# Patient Record
Sex: MALE | Race: WHITE | NOT HISPANIC OR LATINO | Employment: STUDENT | URBAN - NONMETROPOLITAN AREA
[De-identification: names, ages, dates, MRNs, and addresses within clinical notes are randomized per-mention and may not be internally consistent; named-entity substitution may affect disease eponyms.]

---
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
## 2022-02-23 ENCOUNTER — OFFICE VISIT (OUTPATIENT)
Dept: FAMILY MEDICINE | Facility: CLINIC | Age: 22
End: 2022-02-23
Payer: COMMERCIAL

## 2022-02-23 VITALS
TEMPERATURE: 98 F | HEIGHT: 69 IN | SYSTOLIC BLOOD PRESSURE: 122 MMHG | WEIGHT: 150 LBS | RESPIRATION RATE: 18 BRPM | DIASTOLIC BLOOD PRESSURE: 68 MMHG | BODY MASS INDEX: 22.22 KG/M2 | OXYGEN SATURATION: 99 % | HEART RATE: 101 BPM

## 2022-02-23 DIAGNOSIS — K52.9 GASTROENTERITIS: Primary | ICD-10-CM

## 2022-02-23 DIAGNOSIS — Z20.828 CONTACT WITH AND (SUSPECTED) EXPOSURE TO OTHER VIRAL COMMUNICABLE DISEASES: ICD-10-CM

## 2022-02-23 LAB
CTP QC/QA: YES
FLUAV AG NPH QL: NEGATIVE
FLUBV AG NPH QL: NEGATIVE
SARS-COV-2 AG RESP QL IA.RAPID: NEGATIVE

## 2022-02-23 PROCEDURE — 3074F SYST BP LT 130 MM HG: CPT | Mod: ,,, | Performed by: NURSE PRACTITIONER

## 2022-02-23 PROCEDURE — 1160F PR REVIEW ALL MEDS BY PRESCRIBER/CLIN PHARMACIST DOCUMENTED: ICD-10-PCS | Mod: ,,, | Performed by: NURSE PRACTITIONER

## 2022-02-23 PROCEDURE — 3078F PR MOST RECENT DIASTOLIC BLOOD PRESSURE < 80 MM HG: ICD-10-PCS | Mod: ,,, | Performed by: NURSE PRACTITIONER

## 2022-02-23 PROCEDURE — 3008F PR BODY MASS INDEX (BMI) DOCUMENTED: ICD-10-PCS | Mod: ,,, | Performed by: NURSE PRACTITIONER

## 2022-02-23 PROCEDURE — 1160F RVW MEDS BY RX/DR IN RCRD: CPT | Mod: ,,, | Performed by: NURSE PRACTITIONER

## 2022-02-23 PROCEDURE — 87428 POCT SARS-COV2 (COVID) WITH FLU ANTIGEN: ICD-10-PCS | Mod: QW,,, | Performed by: NURSE PRACTITIONER

## 2022-02-23 PROCEDURE — 3008F BODY MASS INDEX DOCD: CPT | Mod: ,,, | Performed by: NURSE PRACTITIONER

## 2022-02-23 PROCEDURE — 99212 OFFICE O/P EST SF 10 MIN: CPT | Mod: ,,, | Performed by: NURSE PRACTITIONER

## 2022-02-23 PROCEDURE — 3078F DIAST BP <80 MM HG: CPT | Mod: ,,, | Performed by: NURSE PRACTITIONER

## 2022-02-23 PROCEDURE — 99212 PR OFFICE/OUTPT VISIT, EST, LEVL II, 10-19 MIN: ICD-10-PCS | Mod: ,,, | Performed by: NURSE PRACTITIONER

## 2022-02-23 PROCEDURE — 1159F PR MEDICATION LIST DOCUMENTED IN MEDICAL RECORD: ICD-10-PCS | Mod: ,,, | Performed by: NURSE PRACTITIONER

## 2022-02-23 PROCEDURE — 87428 SARSCOV & INF VIR A&B AG IA: CPT | Mod: QW,,, | Performed by: NURSE PRACTITIONER

## 2022-02-23 PROCEDURE — 3074F PR MOST RECENT SYSTOLIC BLOOD PRESSURE < 130 MM HG: ICD-10-PCS | Mod: ,,, | Performed by: NURSE PRACTITIONER

## 2022-02-23 PROCEDURE — 1159F MED LIST DOCD IN RCRD: CPT | Mod: ,,, | Performed by: NURSE PRACTITIONER

## 2022-02-24 ENCOUNTER — OFFICE VISIT (OUTPATIENT)
Dept: FAMILY MEDICINE | Facility: CLINIC | Age: 22
End: 2022-02-24
Payer: COMMERCIAL

## 2022-02-24 VITALS
DIASTOLIC BLOOD PRESSURE: 72 MMHG | HEIGHT: 69 IN | HEART RATE: 68 BPM | SYSTOLIC BLOOD PRESSURE: 108 MMHG | TEMPERATURE: 98 F | BODY MASS INDEX: 22.22 KG/M2 | WEIGHT: 150 LBS | RESPIRATION RATE: 16 BRPM | OXYGEN SATURATION: 99 %

## 2022-02-24 DIAGNOSIS — K52.9 GASTROENTERITIS: ICD-10-CM

## 2022-02-24 DIAGNOSIS — R17 SCLERAL ICTERUS: Primary | ICD-10-CM

## 2022-02-24 DIAGNOSIS — R10.10 PAIN OF UPPER ABDOMEN: ICD-10-CM

## 2022-02-24 LAB
ALBUMIN SERPL BCP-MCNC: 4.5 G/DL (ref 3.5–5)
ALBUMIN/GLOB SERPL: 1.3 {RATIO}
ALP SERPL-CCNC: 114 U/L (ref 45–115)
ALT SERPL W P-5'-P-CCNC: 29 U/L (ref 16–61)
AMYLASE SERPL-CCNC: 41 U/L (ref 25–115)
ANION GAP SERPL CALCULATED.3IONS-SCNC: 9 MMOL/L (ref 7–16)
AST SERPL W P-5'-P-CCNC: 22 U/L (ref 15–37)
BASOPHILS # BLD AUTO: 0.01 K/UL (ref 0–0.2)
BASOPHILS NFR BLD AUTO: 0.3 % (ref 0–1)
BILIRUB SERPL-MCNC: 5.7 MG/DL (ref 0–1.2)
BUN SERPL-MCNC: 23 MG/DL (ref 7–18)
BUN/CREAT SERPL: 16 (ref 6–20)
CALCIUM SERPL-MCNC: 9.3 MG/DL (ref 8.5–10.1)
CHLORIDE SERPL-SCNC: 104 MMOL/L (ref 98–107)
CO2 SERPL-SCNC: 29 MMOL/L (ref 21–32)
CREAT SERPL-MCNC: 1.42 MG/DL (ref 0.7–1.3)
DIFFERENTIAL METHOD BLD: ABNORMAL
EOSINOPHIL # BLD AUTO: 0.03 K/UL (ref 0–0.5)
EOSINOPHIL NFR BLD AUTO: 0.8 % (ref 1–4)
ERYTHROCYTE [DISTWIDTH] IN BLOOD BY AUTOMATED COUNT: 13.5 % (ref 11.5–14.5)
GLOBULIN SER-MCNC: 3.4 G/DL (ref 2–4)
GLUCOSE SERPL-MCNC: 87 MG/DL (ref 74–106)
HCT VFR BLD AUTO: 46.9 % (ref 40–54)
HETEROPH AB SER QL LA: NEGATIVE
HGB BLD-MCNC: 15.9 G/DL (ref 13.5–18)
IMM GRANULOCYTES # BLD AUTO: 0.01 K/UL (ref 0–0.04)
IMM GRANULOCYTES NFR BLD: 0.3 % (ref 0–0.4)
LIPASE SERPL-CCNC: 120 U/L (ref 73–393)
LYMPHOCYTES # BLD AUTO: 1.2 K/UL (ref 1–4.8)
LYMPHOCYTES NFR BLD AUTO: 31.4 % (ref 27–41)
MCH RBC QN AUTO: 30.7 PG (ref 27–31)
MCHC RBC AUTO-ENTMCNC: 33.9 G/DL (ref 32–36)
MCV RBC AUTO: 90.5 FL (ref 80–96)
MONOCYTES # BLD AUTO: 0.65 K/UL (ref 0–0.8)
MONOCYTES NFR BLD AUTO: 17 % (ref 2–6)
MPC BLD CALC-MCNC: 11.6 FL (ref 9.4–12.4)
NEUTROPHILS # BLD AUTO: 1.92 K/UL (ref 1.8–7.7)
NEUTROPHILS NFR BLD AUTO: 50.2 % (ref 53–65)
NRBC # BLD AUTO: 0 X10E3/UL
NRBC, AUTO (.00): 0 %
PLATELET # BLD AUTO: 269 K/UL (ref 150–400)
POTASSIUM SERPL-SCNC: 3.6 MMOL/L (ref 3.5–5.1)
PROT SERPL-MCNC: 7.9 G/DL (ref 6.4–8.2)
RBC # BLD AUTO: 5.18 M/UL (ref 4.6–6.2)
SODIUM SERPL-SCNC: 138 MMOL/L (ref 136–145)
WBC # BLD AUTO: 3.82 K/UL (ref 4.5–11)

## 2022-02-24 PROCEDURE — 3078F DIAST BP <80 MM HG: CPT | Mod: ,,, | Performed by: FAMILY MEDICINE

## 2022-02-24 PROCEDURE — 86644 CMV ANTIBODY: CPT | Mod: 90,,, | Performed by: CLINICAL MEDICAL LABORATORY

## 2022-02-24 PROCEDURE — 80074 ACUTE HEPATITIS PANEL: CPT | Mod: ,,, | Performed by: CLINICAL MEDICAL LABORATORY

## 2022-02-24 PROCEDURE — 82150 ASSAY OF AMYLASE: CPT | Mod: ,,, | Performed by: CLINICAL MEDICAL LABORATORY

## 2022-02-24 PROCEDURE — 3008F BODY MASS INDEX DOCD: CPT | Mod: ,,, | Performed by: FAMILY MEDICINE

## 2022-02-24 PROCEDURE — 83690 LIPASE: ICD-10-PCS | Mod: ,,, | Performed by: CLINICAL MEDICAL LABORATORY

## 2022-02-24 PROCEDURE — 1159F MED LIST DOCD IN RCRD: CPT | Mod: ,,, | Performed by: FAMILY MEDICINE

## 2022-02-24 PROCEDURE — 3074F SYST BP LT 130 MM HG: CPT | Mod: ,,, | Performed by: FAMILY MEDICINE

## 2022-02-24 PROCEDURE — 3008F PR BODY MASS INDEX (BMI) DOCUMENTED: ICD-10-PCS | Mod: ,,, | Performed by: FAMILY MEDICINE

## 2022-02-24 PROCEDURE — 86645 CMV ANTIBODY IGM: CPT | Mod: 90,,, | Performed by: CLINICAL MEDICAL LABORATORY

## 2022-02-24 PROCEDURE — 86645 CYTOMEGALOVIRUS (CMV) AB, IGM: ICD-10-PCS | Mod: 90,,, | Performed by: CLINICAL MEDICAL LABORATORY

## 2022-02-24 PROCEDURE — 80074 HEPATITIS PANEL, ACUTE: ICD-10-PCS | Mod: ,,, | Performed by: CLINICAL MEDICAL LABORATORY

## 2022-02-24 PROCEDURE — 86644 CYTOMEGALOVIRUS ANTIBODY, IGG: ICD-10-PCS | Mod: 90,,, | Performed by: CLINICAL MEDICAL LABORATORY

## 2022-02-24 PROCEDURE — 86308 HETEROPHILE AB SCREEN: ICD-10-PCS | Mod: ,,, | Performed by: CLINICAL MEDICAL LABORATORY

## 2022-02-24 PROCEDURE — 85025 CBC WITH DIFFERENTIAL: ICD-10-PCS | Mod: ,,, | Performed by: CLINICAL MEDICAL LABORATORY

## 2022-02-24 PROCEDURE — 80053 COMPREHENSIVE METABOLIC PANEL: ICD-10-PCS | Mod: ,,, | Performed by: CLINICAL MEDICAL LABORATORY

## 2022-02-24 PROCEDURE — 1159F PR MEDICATION LIST DOCUMENTED IN MEDICAL RECORD: ICD-10-PCS | Mod: ,,, | Performed by: FAMILY MEDICINE

## 2022-02-24 PROCEDURE — 86308 HETEROPHILE ANTIBODY SCREEN: CPT | Mod: ,,, | Performed by: CLINICAL MEDICAL LABORATORY

## 2022-02-24 PROCEDURE — 83690 ASSAY OF LIPASE: CPT | Mod: ,,, | Performed by: CLINICAL MEDICAL LABORATORY

## 2022-02-24 PROCEDURE — 82150 AMYLASE: ICD-10-PCS | Mod: ,,, | Performed by: CLINICAL MEDICAL LABORATORY

## 2022-02-24 PROCEDURE — 3078F PR MOST RECENT DIASTOLIC BLOOD PRESSURE < 80 MM HG: ICD-10-PCS | Mod: ,,, | Performed by: FAMILY MEDICINE

## 2022-02-24 PROCEDURE — 80053 COMPREHEN METABOLIC PANEL: CPT | Mod: ,,, | Performed by: CLINICAL MEDICAL LABORATORY

## 2022-02-24 PROCEDURE — 85025 COMPLETE CBC W/AUTO DIFF WBC: CPT | Mod: ,,, | Performed by: CLINICAL MEDICAL LABORATORY

## 2022-02-24 PROCEDURE — 99214 OFFICE O/P EST MOD 30 MIN: CPT | Mod: ,,, | Performed by: FAMILY MEDICINE

## 2022-02-24 PROCEDURE — 3074F PR MOST RECENT SYSTOLIC BLOOD PRESSURE < 130 MM HG: ICD-10-PCS | Mod: ,,, | Performed by: FAMILY MEDICINE

## 2022-02-24 PROCEDURE — 99214 PR OFFICE/OUTPT VISIT, EST, LEVL IV, 30-39 MIN: ICD-10-PCS | Mod: ,,, | Performed by: FAMILY MEDICINE

## 2022-02-24 RX ORDER — ONDANSETRON 4 MG/1
4 TABLET, ORALLY DISINTEGRATING ORAL 2 TIMES DAILY
Qty: 30 TABLET | Refills: 0 | Status: SHIPPED | OUTPATIENT
Start: 2022-02-24 | End: 2023-01-19 | Stop reason: ALTCHOICE

## 2022-02-24 NOTE — PROGRESS NOTES
She   Nikolas Lomas DO   [address]  [place]  [address]      PATIENT NAME: Kathy Maza  : 2000  DATE: 22  MRN: [id number]      Billing Provider: Nikolas Lomas DO  Level of Service:   Patient PCP Information     Provider PCP Type    ELIDA Rios [facility]          Reason for Visit / Chief Complaint: Back Pain (Right mid back pain since last night. ) and Eye Color Change (Patient's  noticed that patient's eyes were yellow in color. Patient had viral gastroenteritis yesterday.)       Update PCP  Update Chief Complaint         History of Present Illness / Problem Focused Workflow     Kathy Maza presents to the clinic with Back Pain (Right mid back pain since last night. ) and Eye Color Change (Patient's  noticed that patient's eyes were yellow in color. Patient had viral gastroenteritis yesterday.)     Patient was seen yesterday with nausea vomiting and diarrhea.  Today he continues to have nausea but pain in his back on the right side.  He has noticed that his eyes have turned yellow and his urine started dark brown.  He has less diarrhea today.  He denies a cough congestion runny nose sore throat earache or changes in taste or smell.  Patient's COVID testing has been negative.  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      Review of Systems     Review of Systems   Constitutional: Negative for activity change, appetite change, chills, fatigue and fever.   HENT: Negative for nasal congestion, ear discharge, ear pain, mouth dryness, mouth sores, postnasal drip, sinus pressure/congestion, sore throat and voice change.    Eyes: Negative for pain, discharge, redness, itching and visual disturbance.   Respiratory: Negative for apnea, cough, chest tightness, shortness of breath and wheezing.    Cardiovascular: Negative for chest pain, palpitations and leg swelling.   Gastrointestinal: Positive for abdominal  pain, diarrhea, nausea and vomiting. Negative for abdominal distention, anal bleeding, blood in stool, change in bowel habit, constipation, reflux and change in bowel habit.   Endocrine: Negative for cold intolerance, heat intolerance, polydipsia, polyphagia and polyuria.   Genitourinary: Negative for difficulty urinating, enuresis, erectile dysfunction, frequency, genital sores, hematuria and urgency.   Musculoskeletal: Negative for arthralgias, back pain, gait problem, leg pain, myalgias and neck pain.   Integumentary:  Negative for rash, mole/lesion, breast mass and breast discharge.   Allergic/Immunologic: Negative for environmental allergies and food allergies.   Neurological: Negative for dizziness, vertigo, tremors, seizures, syncope, facial asymmetry, speech difficulty, weakness, light-headedness, numbness, headaches, disturbances in coordination, memory loss and coordination difficulties.   Hematological: Negative for adenopathy. Does not bruise/bleed easily.   Psychiatric/Behavioral: Negative for agitation, behavioral problems, confusion, decreased concentration, dysphoric mood, hallucinations, self-injury, sleep disturbance and suicidal ideas. The patient is not nervous/anxious and is not hyperactive.    Breast: Negative for mass      Medical / Social / Family History   History reviewed. No pertinent past medical history.    Past Surgical History:   Procedure Laterality Date    HAND SURGERY Left        Social History    reports that he has never smoked. He uses smokeless tobacco. He reports that he does not drink alcohol and does not use drugs.    Family History  's family history is not on file.    Medications and Allergies     Medications  No outpatient medications have been marked as taking for the 2/24/22 encounter (Office Visit) with Nikolas Lomas DO.       Allergies  Review of patient's allergies indicates:   Allergen Reactions    Penicillins      Other reaction(s): rash  Other  reaction(s): Dermatological problems, e.g., rash, hives         Physical Examination     Vitals:    02/24/22 1638   BP: 108/72   Pulse: 68   Resp: 16   Temp: 97.5 °F (36.4 °C)     Physical Exam  Constitutional:       General: He is not in acute distress.     Appearance: Normal appearance. He is normal weight.   HENT:      Head: Normocephalic.      Nose: Nose normal. No congestion or rhinorrhea.      Mouth/Throat:      Mouth: Mucous membranes are moist.      Pharynx: Oropharynx is clear. No oropharyngeal exudate or posterior oropharyngeal erythema.   Eyes:      General: Scleral icterus present.         Right eye: No discharge.         Left eye: No discharge.      Conjunctiva/sclera: Conjunctivae normal.      Pupils: Pupils are equal, round, and reactive to light.   Cardiovascular:      Rate and Rhythm: Normal rate and regular rhythm.      Pulses: Normal pulses.      Heart sounds: Normal heart sounds. No murmur heard.  Pulmonary:      Effort: Pulmonary effort is normal.      Breath sounds: Normal breath sounds. No wheezing.   Abdominal:      General: Abdomen is flat. Bowel sounds are normal.      Tenderness: There is abdominal tenderness.      Comments: Hepatomegaly noted at 18 cm in the midclavicular line with tender edge below the rib on the right.   Musculoskeletal:         General: Normal range of motion.      Cervical back: Normal range of motion.   Lymphadenopathy:      Cervical: No cervical adenopathy.   Skin:     General: Skin is warm.      Capillary Refill: Capillary refill takes less than 2 seconds.      Coloration: Skin is jaundiced.   Neurological:      General: No focal deficit present.      Mental Status: He is alert and oriented to person, place, and time. Mental status is at baseline.      Cranial Nerves: No cranial nerve deficit.      Sensory: No sensory deficit.      Motor: No weakness.      Coordination: Coordination normal.      Gait: Gait normal.      Deep Tendon Reflexes: Reflexes normal.    Psychiatric:         Mood and Affect: Mood normal.         Behavior: Behavior normal.         Thought Content: Thought content normal.         Judgment: Judgment normal.               No results found for: WBC, HGB, HCT, MCV, PLT       No results found for: NA, K, CL, CO2, GLU, BUN, CREATININE, CALCIUM, PROT, ALBUMIN, BILITOT, ALKPHOS, AST, ALT, ANIONGAP, ESTGFRAFRICA, EGFRNONAA   No image results found.     Procedures   Assessment and Plan (including Health Maintenance)      Problem List  Smart Sets  Document Outside HM   :    Plan:         Health Maintenance Due   Topic Date Due    Hepatitis C Screening  Never done    Lipid Panel  Never done    HPV Vaccines (1 - Male 2-dose series) Never done    HIV Screening  Never done       Problem List Items Addressed This Visit        GI    Scleral icterus - Primary    Current Assessment & Plan     She Likely has a hepatitis etiology but not certain at this stage.  Will give him Zofran as needed for nausea and vomiting.  Follow-up 24 hours after lab test her back.  Patient should not be playing sports for 2 weeks due to hepatomegaly.  Risk for hepatic rupture is high with attack sports.  Patient is a .           Relevant Orders    CBC Auto Differential    Comprehensive Metabolic Panel    Amylase    Lipase    Cytomegalovirus (CMV) Ab, IgM    Cytomegalovirus Antibody, IgG    Heterophile Ab Screen    Hepatitis Panel, Acute    Pain of upper abdomen    Relevant Orders    Cytomegalovirus (CMV) Ab, IgM    Cytomegalovirus Antibody, IgG    Heterophile Ab Screen    Hepatitis Panel, Acute    Gastroenteritis    Current Assessment & Plan     Patient is a gastroenteritis with scleral icterus likely hepatitis AT.  Will draw hepatitis panel as well as the CMV in the mono spot.  Will draw CBC CMP to check his liver function test.  And no contact sports for 2 weeks as he does have an enlarged liver.  Zofran as needed for nausea vomiting and Kaopectate or Imodium for  diarrhea.  Clear liquids for 24 hours then a brat diet.  No milk or milk products for 1 week.  Discussed the patient's case with his mother.  Follow-up 24 hours.           Relevant Medications    ondansetron (ZOFRAN-ODT) 4 MG TbDL    Other Relevant Orders    CBC Auto Differential    Comprehensive Metabolic Panel    Amylase    Lipase    Cytomegalovirus (CMV) Ab, IgM    Cytomegalovirus Antibody, IgG    Heterophile Ab Screen    Hepatitis Panel, Acute          Health Maintenance Topics with due status: Not Due       Topic Last Completion Date    TETANUS VACCINE 08/06/2015       No future appointments.     Follow up in about 1 day (around 2/25/2022).     Signature:  Nikolas Lomas DO  [place] [id number] [place]  [address]    Date of encounter: 2/24/22

## 2022-02-24 NOTE — ASSESSMENT & PLAN NOTE
She Likely has a hepatitis etiology but not certain at this stage.  Will give him Zofran as needed for nausea and vomiting.  Follow-up 24 hours after lab test her back.  Patient should not be playing sports for 2 weeks due to hepatomegaly.  Risk for hepatic rupture is high with attack sports.  Patient is a .

## 2022-02-24 NOTE — LETTER
February 24, 2022    Kathy Maza  [address] [place] [address] [place] [address]             [facility]  [address]  [place] [address]  Phone: [phone]  Fax: [phone] To Whom It May Concern    Patient:Kathy Maza  Date of Visit: 02/24/2022        Kathy Maza was seen at [facility] on 02/24/2022. 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 Patient may not participate in any sports or physical exercise for 2 weeks. Patient must be seen and released by doctor prior to returning to activities.          Dr. Nikolas Lomas,DO

## 2022-02-24 NOTE — ASSESSMENT & PLAN NOTE
Patient is a gastroenteritis with scleral icterus likely hepatitis AT.  Will draw hepatitis panel as well as the CMV in the mono spot.  Will draw CBC CMP to check his liver function test.  And no contact sports for 2 weeks as he does have an enlarged liver.  Zofran as needed for nausea vomiting and Kaopectate or Imodium for diarrhea.  Clear liquids for 24 hours then a brat diet.  No milk or milk products for 1 week.  Discussed the patient's case with his mother.  Follow-up 24 hours.

## 2022-02-25 LAB
HAV IGM SER QL: NORMAL
HBV CORE IGM SER QL: NORMAL
HBV SURFACE AG SERPL QL IA: NORMAL
HCV AB SER QL: NORMAL

## 2022-02-27 LAB
CMV IGG SERPL QL IA: NEGATIVE
CMV IGM SERPL QL IA: NEGATIVE

## 2022-02-28 NOTE — PROGRESS NOTES
ELIDA Fletcher   [facility]  [address]  [place] [address]     PATIENT NAME: Kathy Maza  : 2000  DATE: 22  MRN: [id number]      Billing Provider: ELIDA Fletcher  Level of Service:   Patient PCP Information     Provider PCP Type    ELIDA Rios General          Reason for Visit / Chief Complaint: Vomiting, Abdominal Pain, and Generalized Body Aches       Update PCP  Update Chief Complaint         History of Present Illness / Problem Focused Workflow     Kathy Maza presents to the clinic c/o muscle aches, vomiting and stomach ache since yesterday.    Review of Systems     Review of Systems   Constitutional: Negative.  Negative for activity change, chills, fatigue, fever and unexpected weight change.   HENT: Negative for nasal congestion, rhinorrhea, sinus pressure/congestion, sore throat and trouble swallowing.    Respiratory: Negative for cough and shortness of breath.    Cardiovascular: Negative for chest pain.   Gastrointestinal: Positive for abdominal pain (aching) and vomiting. Negative for abdominal distention, change in bowel habit, constipation, diarrhea, nausea and change in bowel habit.   Endocrine: Negative for polydipsia, polyphagia and polyuria.   Musculoskeletal: Positive for myalgias.   Neurological: Negative for weakness and headaches.        Medical / Social / Family History   History reviewed. No pertinent past medical history.    Past Surgical History:   Procedure Laterality Date    HAND SURGERY Left        Social History    reports that he has never smoked. He uses smokeless tobacco. He reports that he does not drink alcohol and does not use drugs.    Family History  's family history is not on file.    Medications and Allergies     Medications  No outpatient medications have been marked as taking for the 22 encounter (Office Visit) with ELIDA Canela.       Allergies  Review of patient's allergies indicates:  "  Allergen Reactions    Penicillins      Other reaction(s): rash  Other reaction(s): Dermatological problems, e.g., rash, hives         Physical Examination     Vitals:    02/23/22 1325   BP: 122/68   BP Location: Left arm   Patient Position: Sitting   BP Method: Large (Manual)   Pulse: 101   Resp: 18   Temp: 97.5 °F (36.4 °C)   SpO2: 99%   Weight: 68 kg (150 lb)   Height: 5' 9" (1.753 m)      Physical Exam  Constitutional:       General: He is not in acute distress.     Appearance: Normal appearance. He is not ill-appearing.   Neck:      Thyroid: No thyromegaly.   Cardiovascular:      Rate and Rhythm: Normal rate and regular rhythm.      Heart sounds: No murmur heard.  Pulmonary:      Effort: Pulmonary effort is normal. No accessory muscle usage or respiratory distress.      Breath sounds: Normal breath sounds. No wheezing, rhonchi or rales.   Abdominal:      General: Abdomen is flat. Bowel sounds are increased. There is no distension.      Palpations: Abdomen is soft.      Tenderness: There is no abdominal tenderness. There is no guarding or rebound.   Musculoskeletal:         General: Normal range of motion.      Cervical back: Neck supple.   Skin:     General: Skin is warm and dry.      Coloration: Skin is not jaundiced or pale.   Neurological:      Mental Status: He is alert and oriented to person, place, and time.      Cranial Nerves: Cranial nerves are intact.      Gait: Gait is intact.          Assessment and Plan (including Health Maintenance)      Problem List  Smart Sets  Document Outside HM   :        Health Maintenance Due   Topic Date Due    Lipid Panel  Never done    HPV Vaccines (1 - Male 2-dose series) Never done    HIV Screening  Never done       Problem List Items Addressed This Visit        GI    Gastroenteritis - Primary      Other Visit Diagnoses     Contact with and (suspected) exposure to other viral communicable diseases        Relevant Orders    POCT SARS-COV2 (COVID) with Flu Antigen " (Completed)        Gastroenteritis  Comments:  Clear liquid diet for 48 hours and can take zofran as needed. Increase diet as tolerated  COVID and influenza negative.    Contact with and (suspected) exposure to other viral communicable diseases  -     POCT SARS-COV2 (COVID) with Flu Antigen       Health Maintenance Topics with due status: Not Due       Topic Last Completion Date    TETANUS VACCINE 08/06/2015       Follow up if symptoms worsen or fail to improve in 24 hrs.     Signature:  ELIDA Fletcher    Date of encounter: 2/23/22

## 2022-09-30 ENCOUNTER — HOSPITAL ENCOUNTER (OUTPATIENT)
Dept: RADIOLOGY | Facility: HOSPITAL | Age: 22
Discharge: HOME | End: 2022-09-30
Attending: ORTHOPAEDIC SURGERY
Payer: COMMERCIAL

## 2022-09-30 DIAGNOSIS — M25.561 ACUTE PAIN OF RIGHT KNEE: ICD-10-CM

## 2022-09-30 PROCEDURE — 73562 X-RAY EXAM OF KNEE 3: CPT | Mod: TC,RT

## 2023-01-19 ENCOUNTER — OFFICE VISIT (OUTPATIENT)
Dept: FAMILY MEDICINE | Facility: CLINIC | Age: 23
End: 2023-01-19
Payer: COMMERCIAL

## 2023-01-19 VITALS
SYSTOLIC BLOOD PRESSURE: 110 MMHG | WEIGHT: 161 LBS | HEART RATE: 56 BPM | HEIGHT: 69 IN | BODY MASS INDEX: 23.85 KG/M2 | TEMPERATURE: 98 F | RESPIRATION RATE: 16 BRPM | DIASTOLIC BLOOD PRESSURE: 62 MMHG | OXYGEN SATURATION: 97 %

## 2023-01-19 DIAGNOSIS — R17 SCLERAL ICTERUS: Primary | ICD-10-CM

## 2023-01-19 LAB
ALBUMIN SERPL BCP-MCNC: 4.1 G/DL (ref 3.5–5)
ALBUMIN/GLOB SERPL: 1.6 {RATIO}
ALP SERPL-CCNC: 83 U/L (ref 45–115)
ALT SERPL W P-5'-P-CCNC: 33 U/L (ref 16–61)
ANION GAP SERPL CALCULATED.3IONS-SCNC: 10 MMOL/L (ref 7–16)
AST SERPL W P-5'-P-CCNC: 25 U/L (ref 15–37)
BASOPHILS # BLD AUTO: 0.04 K/UL (ref 0–0.2)
BASOPHILS NFR BLD AUTO: 0.7 % (ref 0–1)
BILIRUB SERPL-MCNC: 2.3 MG/DL (ref ?–1.2)
BUN SERPL-MCNC: 11 MG/DL (ref 7–18)
BUN/CREAT SERPL: 10 (ref 6–20)
CALCIUM SERPL-MCNC: 9.1 MG/DL (ref 8.5–10.1)
CHLORIDE SERPL-SCNC: 107 MMOL/L (ref 98–107)
CO2 SERPL-SCNC: 30 MMOL/L (ref 21–32)
CREAT SERPL-MCNC: 1.09 MG/DL (ref 0.7–1.3)
DIFFERENTIAL METHOD BLD: ABNORMAL
EGFR (NO RACE VARIABLE) (RUSH/TITUS): 100 ML/MIN/1.73M²
EOSINOPHIL # BLD AUTO: 0.09 K/UL (ref 0–0.5)
EOSINOPHIL NFR BLD AUTO: 1.5 % (ref 1–4)
ERYTHROCYTE [DISTWIDTH] IN BLOOD BY AUTOMATED COUNT: 13.4 % (ref 11.5–14.5)
GLOBULIN SER-MCNC: 2.6 G/DL (ref 2–4)
GLUCOSE SERPL-MCNC: 97 MG/DL (ref 74–106)
HAPTOGLOB SERPL NEPH-MCNC: 70 MG/DL (ref 30–200)
HCT VFR BLD AUTO: 43.3 % (ref 40–54)
HGB BLD-MCNC: 14.4 G/DL (ref 13.5–18)
IMM GRANULOCYTES # BLD AUTO: 0.01 K/UL (ref 0–0.04)
IMM GRANULOCYTES NFR BLD: 0.2 % (ref 0–0.4)
LYMPHOCYTES # BLD AUTO: 1.54 K/UL (ref 1–4.8)
LYMPHOCYTES NFR BLD AUTO: 25.8 % (ref 27–41)
MCH RBC QN AUTO: 30.4 PG (ref 27–31)
MCHC RBC AUTO-ENTMCNC: 33.3 G/DL (ref 32–36)
MCV RBC AUTO: 91.4 FL (ref 80–96)
MONOCYTES # BLD AUTO: 0.57 K/UL (ref 0–0.8)
MONOCYTES NFR BLD AUTO: 9.5 % (ref 2–6)
MPC BLD CALC-MCNC: 11.1 FL (ref 9.4–12.4)
NEUTROPHILS # BLD AUTO: 3.72 K/UL (ref 1.8–7.7)
NEUTROPHILS NFR BLD AUTO: 62.3 % (ref 53–65)
NRBC # BLD AUTO: 0 X10E3/UL
NRBC, AUTO (.00): 0 %
PLATELET # BLD AUTO: 308 K/UL (ref 150–400)
POTASSIUM SERPL-SCNC: 4 MMOL/L (ref 3.5–5.1)
PROT SERPL-MCNC: 6.7 G/DL (ref 6.4–8.2)
RBC # BLD AUTO: 4.74 M/UL (ref 4.6–6.2)
SODIUM SERPL-SCNC: 143 MMOL/L (ref 136–145)
WBC # BLD AUTO: 5.97 K/UL (ref 4.5–11)

## 2023-01-19 PROCEDURE — 1159F PR MEDICATION LIST DOCUMENTED IN MEDICAL RECORD: ICD-10-PCS | Mod: ,,, | Performed by: FAMILY MEDICINE

## 2023-01-19 PROCEDURE — 3008F BODY MASS INDEX DOCD: CPT | Mod: ,,, | Performed by: FAMILY MEDICINE

## 2023-01-19 PROCEDURE — 1160F RVW MEDS BY RX/DR IN RCRD: CPT | Mod: ,,, | Performed by: FAMILY MEDICINE

## 2023-01-19 PROCEDURE — 3074F SYST BP LT 130 MM HG: CPT | Mod: ,,, | Performed by: FAMILY MEDICINE

## 2023-01-19 PROCEDURE — 82955 GLUCOSE 6 PHOSPHATE DEHYDROGENASE: ICD-10-PCS | Mod: 90,,, | Performed by: CLINICAL MEDICAL LABORATORY

## 2023-01-19 PROCEDURE — 99213 OFFICE O/P EST LOW 20 MIN: CPT | Mod: ,,, | Performed by: FAMILY MEDICINE

## 2023-01-19 PROCEDURE — 83020 HEMOGLOBIN ELECTROPHORESIS: ICD-10-PCS | Mod: ,,, | Performed by: CLINICAL MEDICAL LABORATORY

## 2023-01-19 PROCEDURE — 99213 PR OFFICE/OUTPT VISIT, EST, LEVL III, 20-29 MIN: ICD-10-PCS | Mod: ,,, | Performed by: FAMILY MEDICINE

## 2023-01-19 PROCEDURE — 85025 CBC WITH DIFFERENTIAL: ICD-10-PCS | Mod: ,,, | Performed by: CLINICAL MEDICAL LABORATORY

## 2023-01-19 PROCEDURE — 83020 HEMOGLOBIN ELECTROPHORESIS: CPT | Mod: ,,, | Performed by: CLINICAL MEDICAL LABORATORY

## 2023-01-19 PROCEDURE — 3078F DIAST BP <80 MM HG: CPT | Mod: ,,, | Performed by: FAMILY MEDICINE

## 2023-01-19 PROCEDURE — 3008F PR BODY MASS INDEX (BMI) DOCUMENTED: ICD-10-PCS | Mod: ,,, | Performed by: FAMILY MEDICINE

## 2023-01-19 PROCEDURE — 80053 COMPREHEN METABOLIC PANEL: CPT | Mod: ,,, | Performed by: CLINICAL MEDICAL LABORATORY

## 2023-01-19 PROCEDURE — 83020 HEMOGLOBIN ELECTROPHORESIS: ICD-10-PCS | Mod: 26,,, | Performed by: PATHOLOGY

## 2023-01-19 PROCEDURE — 3074F PR MOST RECENT SYSTOLIC BLOOD PRESSURE < 130 MM HG: ICD-10-PCS | Mod: ,,, | Performed by: FAMILY MEDICINE

## 2023-01-19 PROCEDURE — 83010 ASSAY OF HAPTOGLOBIN QUANT: CPT | Mod: ,,, | Performed by: CLINICAL MEDICAL LABORATORY

## 2023-01-19 PROCEDURE — 80504 PATH CLIN CONSLTJ MOD 21-40: CPT | Mod: ,,, | Performed by: PATHOLOGY

## 2023-01-19 PROCEDURE — 80053 COMPREHENSIVE METABOLIC PANEL: ICD-10-PCS | Mod: ,,, | Performed by: CLINICAL MEDICAL LABORATORY

## 2023-01-19 PROCEDURE — 1160F PR REVIEW ALL MEDS BY PRESCRIBER/CLIN PHARMACIST DOCUMENTED: ICD-10-PCS | Mod: ,,, | Performed by: FAMILY MEDICINE

## 2023-01-19 PROCEDURE — 1159F MED LIST DOCD IN RCRD: CPT | Mod: ,,, | Performed by: FAMILY MEDICINE

## 2023-01-19 PROCEDURE — 83010 HAPTOGLOBIN: ICD-10-PCS | Mod: ,,, | Performed by: CLINICAL MEDICAL LABORATORY

## 2023-01-19 PROCEDURE — 82955 ASSAY OF G6PD ENZYME: CPT | Mod: 90,,, | Performed by: CLINICAL MEDICAL LABORATORY

## 2023-01-19 PROCEDURE — 85025 COMPLETE CBC W/AUTO DIFF WBC: CPT | Mod: ,,, | Performed by: CLINICAL MEDICAL LABORATORY

## 2023-01-19 PROCEDURE — 83020 HEMOGLOBIN ELECTROPHORESIS: CPT | Mod: 26,,, | Performed by: PATHOLOGY

## 2023-01-19 PROCEDURE — 80504 PATH REVIEW OF BLOOD SMEAR: ICD-10-PCS | Mod: ,,, | Performed by: PATHOLOGY

## 2023-01-19 PROCEDURE — 3078F PR MOST RECENT DIASTOLIC BLOOD PRESSURE < 80 MM HG: ICD-10-PCS | Mod: ,,, | Performed by: FAMILY MEDICINE

## 2023-01-20 LAB — PATH REV BLD -IMP: NORMAL

## 2023-01-23 LAB — G6PD RBC-CCNT: 8.8 U/G HB (ref 8–11.9)

## 2023-01-23 NOTE — PROGRESS NOTES
Subjective:       Patient ID: Kathy Maza is a 22 y.o. male.    Chief Complaint: Eye Color Change (Patient's team mates noticed that patient icterus were discolored today. Patient has history of this same issue last year.)    22 y.o. male with a PMHx of scleral icterus and gastroenteritis presented to the clinic reporting yellow eyes today. Pt reports he was at his game and his teammates started pointing out his eyes were yellow. Pt denies any trauma, sick contact, recent travel, ibuprofen, testosterone, or any other new food or hygiene products. Per patient, he does take a pre-workout called bucked up. Pt denies any fever, N/V/D, abdominal pain, black or tarry stools, dysuria, or any other complaints at this time.       No current outpatient medications on file.    Review of patient's allergies indicates:   Allergen Reactions    Penicillins      Other reaction(s): rash  Other reaction(s): Dermatological problems, e.g., rash, hives         History reviewed. No pertinent past medical history.    Past Surgical History:   Procedure Laterality Date    HAND SURGERY Left        Family History   Problem Relation Age of Onset    No Known Problems Mother     Suicide Father     No Known Problems Brother        Social History     Tobacco Use    Smoking status: Never   Substance Use Topics    Alcohol use: Yes    Drug use: Never       Review of Systems   Constitutional:  Negative for chills and fever.   HENT:  Negative for hearing loss and sore throat.    Eyes:  Negative for photophobia, pain, discharge, redness, itching, visual disturbance and eye dryness.        Yellow eyes bilaterally   Respiratory:  Negative for cough, chest tightness, shortness of breath and wheezing.    Cardiovascular:  Negative for chest pain, palpitations and leg swelling.   Gastrointestinal:  Negative for abdominal pain, diarrhea, nausea and vomiting.   Endocrine: Negative for polyuria.   Genitourinary:  Negative for dysuria and hematuria.  "  Musculoskeletal:  Negative for gait problem and myalgias.   Integumentary:  Positive for color change (eyes). Negative for rash.   Neurological:  Negative for speech difficulty, weakness, light-headedness and headaches.   Psychiatric/Behavioral:  The patient is nervous/anxious.        Current Medications:   Medication List with Changes/Refills   Discontinued Medications    ONDANSETRON (ZOFRAN-ODT) 4 MG TBDL    Take 2 tablets (8 mg total) by mouth every 6 (six) hours as needed (nausea and vomiting).       Start Date: 11/11/2021End Date: 1/19/2023    ONDANSETRON (ZOFRAN-ODT) 4 MG TBDL    Take 1 tablet (4 mg total) by mouth 2 (two) times daily.       Start Date: 2/24/2022 End Date: 1/19/2023            Objective:        Vitals:    01/19/23 1417   BP: 110/62   BP Location: Left arm   Patient Position: Sitting   BP Method: Medium (Manual)   Pulse: (!) 56   Resp: 16   Temp: 97.8 °F (36.6 °C)   TempSrc: Oral   SpO2: 97%   Weight: 73 kg (161 lb)   Height: 5' 9" (1.753 m)       Physical Exam  Vitals reviewed.   Constitutional:       General: He is not in acute distress.     Appearance: Normal appearance. He is normal weight. He is not ill-appearing, toxic-appearing or diaphoretic.   HENT:      Head: Normocephalic and atraumatic.      Right Ear: External ear normal.      Left Ear: External ear normal.      Nose: Nose normal.      Mouth/Throat:      Mouth: Mucous membranes are moist.      Pharynx: Oropharynx is clear.   Eyes:      General: Scleral icterus present.      Extraocular Movements: Extraocular movements intact.      Conjunctiva/sclera: Conjunctivae normal.      Pupils: Pupils are equal, round, and reactive to light.   Cardiovascular:      Rate and Rhythm: Normal rate and regular rhythm.      Pulses: Normal pulses.      Heart sounds: Normal heart sounds. No murmur heard.  Pulmonary:      Effort: Pulmonary effort is normal. No respiratory distress.      Breath sounds: Normal breath sounds. No wheezing, rhonchi or " rales.   Abdominal:      General: Abdomen is flat. Bowel sounds are normal. There is no distension.      Palpations: Abdomen is soft.      Tenderness: There is no abdominal tenderness. There is no guarding or rebound.   Musculoskeletal:         General: No swelling. Normal range of motion.      Cervical back: Normal range of motion and neck supple. No rigidity.   Skin:     General: Skin is warm and dry.      Capillary Refill: Capillary refill takes less than 2 seconds.      Findings: No rash.   Neurological:      General: No focal deficit present.      Mental Status: He is alert and oriented to person, place, and time. Mental status is at baseline.   Psychiatric:         Mood and Affect: Mood normal.         Behavior: Behavior normal.         Thought Content: Thought content normal.         Judgment: Judgment normal.             Lab Results   Component Value Date    WBC 5.97 01/19/2023    HGB 14.4 01/19/2023    HCT 43.3 01/19/2023     01/19/2023    ALT 33 01/19/2023    AST 25 01/19/2023     01/19/2023    K 4.0 01/19/2023     01/19/2023    CREATININE 1.09 01/19/2023    BUN 11 01/19/2023    CO2 30 01/19/2023      Assessment:       1. Scleral icterus          Plan:         Problem List Items Addressed This Visit          GI    Scleral icterus - Primary     -CMP shows elevated total bilirubin.Peripheral blood smear pathologist's review shows normal morphology. No evidence of malignancy, dysplasia, hemolysis, or pathologic organisms.   -G6PD and hemoglobin electropharesis are still pending  -pt told to stop taking his pre-workout after looking at the ingredients in his pre-workout during office visit.         Relevant Orders    CBC Auto Differential (Completed)    Comprehensive Metabolic Panel (Completed)    Haptoglobin (Completed)    Glucose-6-Phosphate Dehydrogenase    Hemglobin Electrophoresis    Path Review, Peripheral Smear (Completed)         Follow up in about 1 month (around 2/19/2023), or if  symptoms worsen or fail to improve.    Evelina JOSE Sousa DO     Instructed patient that if symptoms fail to improve or worsen patient should seek immediate medical attention or report to the nearest emergency department. Patient expressed verbal agreement and understanding to this plan of care.

## 2023-01-23 NOTE — ASSESSMENT & PLAN NOTE
-CMP shows elevated total bilirubin.Peripheral blood smear pathologist's review shows normal morphology. No evidence of malignancy, dysplasia, hemolysis, or pathologic organisms.   -G6PD and hemoglobin electropharesis are still pending  -pt told to stop taking his pre-workout after looking at the ingredients in his pre-workout during office visit.

## 2023-01-24 LAB
ERYTHROCYTE [DISTWIDTH] IN BLOOD BY AUTOMATED COUNT: 13.5 % (ref 11.5–14.5)
HCT VFR BLD AUTO: 44 % (ref 40–54)
HGB A1 MFR BLD ELPH: 97.5 % (ref 95.8–98)
HGB A2 MFR BLD ELPH: 2.5 % (ref 2–3.3)
HGB BLD-MCNC: 14 G/DL (ref 13.5–18)
MCV RBC AUTO: 93.8 FL (ref 80–96)
PATH INTERP BLD-IMP: NORMAL
RBC # BLD AUTO: 4.69 M/UL (ref 4.6–6.2)

## 2023-01-26 ENCOUNTER — TELEPHONE (OUTPATIENT)
Dept: FAMILY MEDICINE | Facility: CLINIC | Age: 23
End: 2023-01-26
Payer: COMMERCIAL

## 2023-01-26 DIAGNOSIS — R17 HIGH SERUM TOTAL BILIRUBIN: Primary | ICD-10-CM

## 2023-01-27 NOTE — TELEPHONE ENCOUNTER
Received VO from Dr. Lomas for a bilirubin direct and indirect lab to be performed. Discussed results and additional labs and GI referral with Pt's mother, she stated that he would need to be referred to a GI specialist in the [facility], or [place] area since he will move back to that area after finishing college. She will notify the Pt to come back to the clinic for additional labs.

## 2023-05-05 ENCOUNTER — PATIENT OUTREACH (OUTPATIENT)
Dept: ADMINISTRATIVE | Facility: HOSPITAL | Age: 23
End: 2023-05-05

## 2023-05-05 NOTE — PROGRESS NOTES
Per [employer] website, insurance is active and pt is listed on the attributed list needing a healthy you performed in 2023  Pt needs appt for hy, sent to [facility] to schedule via one note  Added myself to caregisselle            .  Health Maintenance Due   Topic Date Due    Lipid Panel  Never done    COVID-19 Vaccine (1) Never done    HPV Vaccines (1 - Male 2-dose series) Never done    HIV Screening  Never done

## 2023-10-03 ENCOUNTER — HOSPITAL ENCOUNTER (OUTPATIENT)
Dept: RADIOLOGY | Facility: HOSPITAL | Age: 23
Discharge: HOME | End: 2023-10-03
Attending: ORTHOPAEDIC SURGERY
Payer: COMMERCIAL

## 2023-10-03 ENCOUNTER — OFFICE VISIT (OUTPATIENT)
Dept: ORTHOPEDICS | Facility: CLINIC | Age: 23
End: 2023-10-03
Payer: COMMERCIAL

## 2023-10-03 DIAGNOSIS — M79.643 PAIN OF HAND, UNSPECIFIED LATERALITY: Primary | ICD-10-CM

## 2023-10-03 DIAGNOSIS — M25.531 WRIST PAIN, ACUTE, RIGHT: ICD-10-CM

## 2023-10-03 DIAGNOSIS — M25.561 ACUTE PAIN OF RIGHT KNEE: Primary | ICD-10-CM

## 2023-10-03 PROCEDURE — 73110 X-RAY EXAM OF WRIST: CPT | Mod: TC,RT

## 2023-10-03 PROCEDURE — 99213 OFFICE O/P EST LOW 20 MIN: CPT | Mod: S$GLB,,, | Performed by: ORTHOPAEDIC SURGERY

## 2023-10-03 PROCEDURE — 99212 OFFICE O/P EST SF 10 MIN: CPT | Mod: PBBFAC | Performed by: ORTHOPAEDIC SURGERY

## 2023-10-03 PROCEDURE — 73110 XR WRIST COMPLETE 3 VIEWS RIGHT: ICD-10-PCS | Mod: 26,RT,, | Performed by: ORTHOPAEDIC SURGERY

## 2023-10-03 PROCEDURE — 73110 X-RAY EXAM OF WRIST: CPT | Mod: 26,RT,, | Performed by: ORTHOPAEDIC SURGERY

## 2023-10-03 PROCEDURE — 99213 PR OFFICE/OUTPT VISIT, EST, LEVL III, 20-29 MIN: ICD-10-PCS | Mod: S$GLB,,, | Performed by: ORTHOPAEDIC SURGERY

## 2023-10-03 NOTE — PROGRESS NOTES
CC:   Chief Complaint   Patient presents with    Right Wrist - Injury     KOBE        PREVIOUS INFO:        HISTORY:   10/3/2023    Kathy Maza  is a 22 y.o. 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       PAST SURGICAL HISTORY:   Past Surgical History:   Procedure Laterality Date    HAND SURGERY Left           ALLERGIES:   Review of patient's allergies indicates:   Allergen Reactions    Penicillins      Other reaction(s): rash  Other reaction(s): Dermatological problems, e.g., rash, hives          MEDICATIONS :  No current outpatient medications on file.     SOCIAL HISTORY:   Social History     Socioeconomic History    Marital status: Single    Number of children: 0   Tobacco Use    Smoking status: Never   Substance and Sexual Activity    Alcohol use: Yes    Drug use: Never    Sexual activity: Yes     Partners: Female     Birth control/protection: Condom   Social History Narrative    Lives in the dorms at [facility]        ROS    FAMILY HISTORY:   Family History   Problem Relation Age of Onset    No Known Problems Mother     Suicide Father     No Known Problems Brother           PHYSICAL EXAM: There were no vitals filed for this visit.            There is no height or weight on file to calculate BMI.     In general, this is a well-developed, well-nourished male . The patient is alert, oriented and cooperative.      HEENT:  Normocephalic, atraumatic.  Extraocular movements are intact bilaterally.  The oropharynx is benign.       NECK:  Nontender with good range of motion.      PULMONARY: Respirations are even and non-labored.       CARDIOVASCULAR: Pulses regular by peripheral palpation.       ABDOMEN:  Soft,  non-tender, non-distended.        EXTREMITIES:  On exam right upper extremity elbows nontender the wrist itself is nontender the fingers are nontender he has full range of motion he is tender over the distal forearm on the  radial dorsal side no crepitance consistent with the intersection syndrome    Ortho Exam      RADIOGRAPHIC FINDINGS:  Right wrist three views AP lateral oblique view normal bone mineralization growth plates are closed normal carpal alignment no fracture dislocation appreciated      .      IMPRESSION:  Right forearm swelling from a direct blow baseball hit him yesterday will treat conservatively with negative x-rays wrist brace Voltaren gel ice let it calmed down he can do gentle range of motion see him back p.r.n. will treat conservatively ice Voltaren gel wrist brace want him to a  Restless thing done developing intersection syndrome      PLAN:  P.r.n.        No follow-ups on file.         Bruce Carrillo III      (Subject to voice recognition error, transcription service not allowed)

## 2023-11-07 ENCOUNTER — OFFICE VISIT (OUTPATIENT)
Dept: FAMILY MEDICINE | Facility: CLINIC | Age: 23
End: 2023-11-07
Payer: COMMERCIAL

## 2023-11-07 VITALS
HEIGHT: 69 IN | RESPIRATION RATE: 16 BRPM | BODY MASS INDEX: 23.35 KG/M2 | DIASTOLIC BLOOD PRESSURE: 80 MMHG | TEMPERATURE: 98 F | WEIGHT: 157.63 LBS | HEART RATE: 77 BPM | SYSTOLIC BLOOD PRESSURE: 118 MMHG | OXYGEN SATURATION: 99 %

## 2023-11-07 DIAGNOSIS — R17 SCLERAL ICTERUS: Primary | ICD-10-CM

## 2023-11-07 PROCEDURE — 1160F PR REVIEW ALL MEDS BY PRESCRIBER/CLIN PHARMACIST DOCUMENTED: ICD-10-PCS | Mod: ,,, | Performed by: NURSE PRACTITIONER

## 2023-11-07 PROCEDURE — 1159F PR MEDICATION LIST DOCUMENTED IN MEDICAL RECORD: ICD-10-PCS | Mod: ,,, | Performed by: NURSE PRACTITIONER

## 2023-11-07 PROCEDURE — 3074F PR MOST RECENT SYSTOLIC BLOOD PRESSURE < 130 MM HG: ICD-10-PCS | Mod: ,,, | Performed by: NURSE PRACTITIONER

## 2023-11-07 PROCEDURE — 3079F DIAST BP 80-89 MM HG: CPT | Mod: ,,, | Performed by: NURSE PRACTITIONER

## 2023-11-07 PROCEDURE — 99212 OFFICE O/P EST SF 10 MIN: CPT | Mod: ,,, | Performed by: NURSE PRACTITIONER

## 2023-11-07 PROCEDURE — 3008F PR BODY MASS INDEX (BMI) DOCUMENTED: ICD-10-PCS | Mod: ,,, | Performed by: NURSE PRACTITIONER

## 2023-11-07 PROCEDURE — 1160F RVW MEDS BY RX/DR IN RCRD: CPT | Mod: ,,, | Performed by: NURSE PRACTITIONER

## 2023-11-07 PROCEDURE — 3074F SYST BP LT 130 MM HG: CPT | Mod: ,,, | Performed by: NURSE PRACTITIONER

## 2023-11-07 PROCEDURE — 99212 PR OFFICE/OUTPT VISIT, EST, LEVL II, 10-19 MIN: ICD-10-PCS | Mod: ,,, | Performed by: NURSE PRACTITIONER

## 2023-11-07 PROCEDURE — 3079F PR MOST RECENT DIASTOLIC BLOOD PRESSURE 80-89 MM HG: ICD-10-PCS | Mod: ,,, | Performed by: NURSE PRACTITIONER

## 2023-11-07 PROCEDURE — 1159F MED LIST DOCD IN RCRD: CPT | Mod: ,,, | Performed by: NURSE PRACTITIONER

## 2023-11-07 PROCEDURE — 3008F BODY MASS INDEX DOCD: CPT | Mod: ,,, | Performed by: NURSE PRACTITIONER

## 2023-11-07 RX ORDER — CYANOCOBALAMIN 1000 UG/ML
INJECTION, SOLUTION INTRAMUSCULAR; SUBCUTANEOUS
COMMUNITY
Start: 2023-05-13

## 2023-12-15 NOTE — ASSESSMENT & PLAN NOTE
Patient refuses for any labs to be done or to be sent for any diagnostic imaging.  He states he has gone through all this in the past and they did not find anything I did note in his chart that he was referred to a GI specialist and [facility].  He states he never went.  He refuses referral to be placed at this time.  I discussed with him in depth dangers of untreated liver issues.  He continues to  refuse any further intervention. Instructed he should should not be playing sports for 2 weeks due to risk of hepatomegaly, Patient is a .

## 2023-12-15 NOTE — PROGRESS NOTES
Haily Judge NP   [id number] [facility] [address]  [place]  [address]      PATIENT NAME: Kathy Maza  : 2000  DATE: 23  MRN: [id number]      Billing Provider: Haily Judge NP  Level of Service: [place] OFFICE/OUTPT VISIT, EST, LEVL II, 10-19 MIN  Patient PCP Information       Provider PCP Type    Nikolas Lomas DO General            Reason for Visit / Chief Complaint: Eye Color Change (Reports his eyes have been yellow for around a month now. He has had this happen several times in the past and has had an elevated bilirubin level. The last time he was seen for this was in 2023. Reports he was using pre-workout then and was told to stop. Denies use of pre-workout since then. )         History of Present Illness / Problem Focused Workflow     23-year-old male presents to clinic with complaints of his eyes turning yellow.  Reports his eyes have been yellow for around a month now. He has had this happen several times in the past and has had an elevated bilirubin level. The last time he was seen for this was in 2023. Reports he was using pre-workout then and was told to stop. Denies use of pre-workout since then.  States he is not a heavy drinker of alcohol.  Denies any use of drugs-prescription or street.  Denies any abdominal pain.  His skin is somewhat olive in appearance but he states this is the normal color of his skin.        Review of Systems     @Review of Systems   Constitutional:  Negative for activity change, appetite change, fatigue and fever.   HENT:  Negative for nasal congestion, ear pain, rhinorrhea, sinus pressure/congestion and sore throat.    Eyes:  Negative for pain, redness, visual disturbance and eye dryness.        Yellow appearing eyes   Respiratory:  Negative for cough and shortness of breath.    Cardiovascular:  Negative for chest pain and leg swelling.   Gastrointestinal:  Negative for abdominal distention, abdominal pain, constipation and diarrhea.    Endocrine: Negative for cold intolerance, heat intolerance and polyuria.   Genitourinary:  Negative for bladder incontinence, dysuria, frequency and urgency.   Musculoskeletal:  Negative for arthralgias, gait problem and myalgias.   Integumentary:  Negative for color change, rash and wound.   Allergic/Immunologic: Negative for environmental allergies and food allergies.   Neurological:  Negative for dizziness, weakness, light-headedness and headaches.   Psychiatric/Behavioral:  Negative for behavioral problems and sleep disturbance.        Medical / Social / Family History   History reviewed. No pertinent past medical history.    Past Surgical History:   Procedure Laterality Date    HAND SURGERY Left        Medications and Allergies     Medications  Outpatient Medications Marked as Taking for the 11/7/23 encounter (Office Visit) with Haily Judge NP   Medication Sig Dispense Refill    cyanocobalamin 1,000 mcg/mL injection INJECT 1ML INTO MUSCLE         Allergies  Review of patient's allergies indicates:   Allergen Reactions    Penicillins      Other reaction(s): rash  Other reaction(s): Dermatological problems, e.g., rash, hives         Physical Examination     Vitals:    11/07/23 1444   BP: 118/80   Pulse:    Resp:    Temp:      Physical Exam  Vitals and nursing note reviewed.   HENT:      Head: Normocephalic.      Right Ear: Tympanic membrane normal.      Left Ear: Tympanic membrane normal.      Nose: Nose normal.      Mouth/Throat:      Mouth: Mucous membranes are moist.      Pharynx: Oropharynx is clear. No posterior oropharyngeal erythema.   Eyes:      General: Scleral icterus present.      Conjunctiva/sclera: Conjunctivae normal.   Cardiovascular:      Rate and Rhythm: Normal rate and regular rhythm.      Pulses: Normal pulses.      Heart sounds: Normal heart sounds.   Pulmonary:      Effort: Pulmonary effort is normal.      Breath sounds: Normal breath sounds.   Abdominal:      General: Abdomen is  flat. Bowel sounds are normal. There is no distension.      Palpations: Abdomen is soft.   Musculoskeletal:         General: No swelling or tenderness. Normal range of motion.      Cervical back: Normal range of motion.      Right lower leg: No edema.      Left lower leg: No edema.   Skin:     General: Skin is warm and dry.      Capillary Refill: Capillary refill takes less than 2 seconds.      Comments: Olive in appearance. He states this is normal for him.    Neurological:      Mental Status: He is alert. Mental status is at baseline.   Psychiatric:         Mood and Affect: Mood normal.         Behavior: Behavior normal.               Lab Results   Component Value Date    WBC 5.97 01/19/2023    HGB 14.0 01/19/2023    HGB 14.4 01/19/2023    HCT 44.0 01/19/2023    HCT 43.3 01/19/2023    MCV 93.8 01/19/2023    MCV 91.4 01/19/2023     01/19/2023        CMP  Sodium   Date Value Ref Range Status   01/19/2023 143 136 - 145 mmol/L Final     Potassium   Date Value Ref Range Status   01/19/2023 4.0 3.5 - 5.1 mmol/L Final     Chloride   Date Value Ref Range Status   01/19/2023 107 98 - 107 mmol/L Final     CO2   Date Value Ref Range Status   01/19/2023 30 21 - 32 mmol/L Final     Glucose   Date Value Ref Range Status   01/19/2023 97 74 - 106 mg/dL Final     BUN   Date Value Ref Range Status   01/19/2023 11 7 - 18 mg/dL Final     Creatinine   Date Value Ref Range Status   01/19/2023 1.09 0.70 - 1.30 mg/dL Final     Calcium   Date Value Ref Range Status   01/19/2023 9.1 8.5 - 10.1 mg/dL Final     Total Protein   Date Value Ref Range Status   01/19/2023 6.7 6.4 - 8.2 g/dL Final     Albumin   Date Value Ref Range Status   01/19/2023 4.1 3.5 - 5.0 g/dL Final     Bilirubin, Total   Date Value Ref Range Status   01/19/2023 2.3 (H) >0.0 - 1.2 mg/dL Final     Alk Phos   Date Value Ref Range Status   01/19/2023 83 45 - 115 U/L Final     AST   Date Value Ref Range Status   01/19/2023 25 15 - 37 U/L Final     ALT   Date Value Ref  Range Status   01/19/2023 33 16 - 61 U/L Final     Anion Gap   Date Value Ref Range Status   01/19/2023 10 7 - 16 mmol/L Final     eGFR   Date Value Ref Range Status   01/19/2023 100 >=60 mL/min/1.73m² Final     Procedures   Assessment and Plan (including Health Maintenance)   :    Plan:     Problem List Items Addressed This Visit          GI    Scleral icterus - Primary    Current Assessment & Plan     Patient refuses for any labs to be done or to be sent for any diagnostic imaging.  He states he has gone through all this in the past and they did not find anything I did note in his chart that he was referred to a GI specialist and [facility].  He states he never went.  He refuses referral to be placed at this time.  I discussed with him in depth dangers of untreated liver issues.  He continues to  refuse any further intervention. Instructed he should should not be playing sports for 2 weeks due to risk of hepatomegaly, Patient is a .            Health Maintenance Topics with due status: Not Due       Topic Last Completion Date    TETANUS VACCINE 08/06/2015       No future appointments.     Health Maintenance Due   Topic Date Due    Lipid Panel  Never done    COVID-19 Vaccine (1) Never done    HPV Vaccines (1 - Male 2-dose series) Never done    HIV Screening  Never done    Influenza Vaccine (1) 09/01/2023          Signature:  Haily Judge NP  [address]  [id number]    Date of encounter: 11/7/23